# Patient Record
Sex: FEMALE | Race: WHITE | Employment: OTHER | URBAN - METROPOLITAN AREA
[De-identification: names, ages, dates, MRNs, and addresses within clinical notes are randomized per-mention and may not be internally consistent; named-entity substitution may affect disease eponyms.]

---

## 2017-01-12 ENCOUNTER — ANTI-COAG VISIT (OUTPATIENT)
Dept: HEMATOLOGY/ONCOLOGY | Age: 72
End: 2017-01-12
Attending: INTERNAL MEDICINE
Payer: MEDICARE

## 2017-01-12 DIAGNOSIS — T81.718A IATROGENIC PULMONARY EMBOLISM AND INFARCTION (HCC): ICD-10-CM

## 2017-01-12 DIAGNOSIS — Z79.01 LONG TERM (CURRENT) USE OF ANTICOAGULANTS: Primary | ICD-10-CM

## 2017-01-12 DIAGNOSIS — I26.99 IATROGENIC PULMONARY EMBOLISM AND INFARCTION (HCC): ICD-10-CM

## 2017-01-12 LAB — INR: 1.9 (ref 0.8–1.3)

## 2017-01-12 PROCEDURE — 85610 PROTHROMBIN TIME: CPT

## 2017-01-12 PROCEDURE — 36416 COLLJ CAPILLARY BLOOD SPEC: CPT

## 2017-01-19 ENCOUNTER — APPOINTMENT (OUTPATIENT)
Dept: HEMATOLOGY/ONCOLOGY | Age: 72
End: 2017-01-19
Attending: INTERNAL MEDICINE
Payer: MEDICARE

## 2017-01-25 ENCOUNTER — ANTI-COAG VISIT (OUTPATIENT)
Dept: HEMATOLOGY/ONCOLOGY | Age: 72
End: 2017-01-25
Attending: INTERNAL MEDICINE
Payer: MEDICARE

## 2017-01-25 DIAGNOSIS — T81.718A IATROGENIC PULMONARY EMBOLISM AND INFARCTION (HCC): ICD-10-CM

## 2017-01-25 DIAGNOSIS — I26.99 IATROGENIC PULMONARY EMBOLISM AND INFARCTION (HCC): ICD-10-CM

## 2017-01-25 DIAGNOSIS — Z79.01 LONG TERM (CURRENT) USE OF ANTICOAGULANTS: Primary | ICD-10-CM

## 2017-01-25 LAB — INR: 2.2 (ref 0.8–1.3)

## 2017-01-25 PROCEDURE — 85610 PROTHROMBIN TIME: CPT

## 2017-01-25 PROCEDURE — 36416 COLLJ CAPILLARY BLOOD SPEC: CPT

## 2017-02-07 ENCOUNTER — OFFICE VISIT (OUTPATIENT)
Dept: HEMATOLOGY/ONCOLOGY | Age: 72
End: 2017-02-07
Attending: INTERNAL MEDICINE
Payer: MEDICARE

## 2017-02-07 VITALS
OXYGEN SATURATION: 98 % | HEIGHT: 63.27 IN | SYSTOLIC BLOOD PRESSURE: 157 MMHG | BODY MASS INDEX: 29.96 KG/M2 | TEMPERATURE: 98 F | DIASTOLIC BLOOD PRESSURE: 81 MMHG | RESPIRATION RATE: 20 BRPM | WEIGHT: 171.19 LBS | HEART RATE: 67 BPM

## 2017-02-07 DIAGNOSIS — R20.2 LEFT LEG PARESTHESIAS: Primary | ICD-10-CM

## 2017-02-07 DIAGNOSIS — I82.412 EMBOLISM AND THROMBOSIS OF LEFT FEMORAL VEIN (HCC): ICD-10-CM

## 2017-02-07 PROCEDURE — 99214 OFFICE O/P EST MOD 30 MIN: CPT | Performed by: INTERNAL MEDICINE

## 2017-02-07 NOTE — PROGRESS NOTES
Pt here for 3 year MD f/u. Pt notes recently her \"bottom half\" is very tired, has also had the flu recently, all within the last 2 weeks. . Pt notes left leg is always cold. Energy level is fair, appetite has been good. Denies pain.      Education Record

## 2017-02-07 NOTE — PROGRESS NOTES
[facility] Progress Note    Patient Name: Fidencio Porter   YOB: 1945   Medical Record Number: [id number]   CSN: [id number]   Attending Physician: Amanda Orozco M.D.    Referring Physician: Andrew Cartagena DO      Date of Visit: 2/7/2017 History    HYSTERECTOMY         Psychosocial History:    Social History   Marital Status:   Spouse Name: N/A    Years of Education: N/A  Number of Children: N/A     Occupational History  None on file     Social History Main Topics   Smoking status: 10/05/2013       Lab Results  Component Value Date   PT 25.3* 03/06/2014   PT 12.3 10/05/2013   PT 20.0* 07/03/2013   INR 2.2* 01/25/2017   INR 1.9* 01/12/2017   INR 2.6* 12/28/2016       Impression and Plan:  Worried about having a recurrent clot with new

## 2017-02-09 ENCOUNTER — HOSPITAL ENCOUNTER (OUTPATIENT)
Dept: ULTRASOUND IMAGING | Age: 72
Discharge: HOME | End: 2017-02-09
Attending: INTERNAL MEDICINE
Payer: MEDICARE

## 2017-02-09 DIAGNOSIS — I82.412 EMBOLISM AND THROMBOSIS OF LEFT FEMORAL VEIN (HCC): ICD-10-CM

## 2017-02-09 DIAGNOSIS — R20.2 LEFT LEG PARESTHESIAS: ICD-10-CM

## 2017-02-09 PROCEDURE — 93971 EXTREMITY STUDY: CPT

## 2017-02-22 ENCOUNTER — APPOINTMENT (OUTPATIENT)
Dept: HEMATOLOGY/ONCOLOGY | Age: 72
End: 2017-02-22
Attending: INTERNAL MEDICINE
Payer: MEDICARE

## 2017-02-27 ENCOUNTER — ANTI-COAG VISIT (OUTPATIENT)
Dept: HEMATOLOGY/ONCOLOGY | Age: 72
End: 2017-02-27
Attending: INTERNAL MEDICINE
Payer: MEDICARE

## 2017-02-27 DIAGNOSIS — I26.99 IATROGENIC PULMONARY EMBOLISM AND INFARCTION (HCC): Primary | ICD-10-CM

## 2017-02-27 DIAGNOSIS — Z79.01 LONG TERM (CURRENT) USE OF ANTICOAGULANTS: ICD-10-CM

## 2017-02-27 DIAGNOSIS — T81.718A IATROGENIC PULMONARY EMBOLISM AND INFARCTION (HCC): Primary | ICD-10-CM

## 2017-02-27 LAB — INR: 2.1 (ref 0.8–1.3)

## 2017-02-27 PROCEDURE — 36416 COLLJ CAPILLARY BLOOD SPEC: CPT

## 2017-02-27 PROCEDURE — 85610 PROTHROMBIN TIME: CPT

## 2017-03-27 ENCOUNTER — ANTI-COAG VISIT (OUTPATIENT)
Dept: HEMATOLOGY/ONCOLOGY | Age: 72
End: 2017-03-27
Attending: INTERNAL MEDICINE
Payer: MEDICARE

## 2017-03-27 DIAGNOSIS — Z79.01 LONG TERM (CURRENT) USE OF ANTICOAGULANTS: ICD-10-CM

## 2017-03-27 DIAGNOSIS — T81.718A IATROGENIC PULMONARY EMBOLISM AND INFARCTION (HCC): Primary | ICD-10-CM

## 2017-03-27 DIAGNOSIS — I26.99 IATROGENIC PULMONARY EMBOLISM AND INFARCTION (HCC): Primary | ICD-10-CM

## 2017-03-27 LAB — INR: 2.3 (ref 0.8–1.3)

## 2017-03-27 PROCEDURE — 85610 PROTHROMBIN TIME: CPT

## 2017-03-27 PROCEDURE — 36416 COLLJ CAPILLARY BLOOD SPEC: CPT

## 2017-04-24 ENCOUNTER — ANTI-COAG VISIT (OUTPATIENT)
Dept: HEMATOLOGY/ONCOLOGY | Age: 72
End: 2017-04-24
Attending: INTERNAL MEDICINE
Payer: MEDICARE

## 2017-04-24 DIAGNOSIS — Z79.01 LONG TERM (CURRENT) USE OF ANTICOAGULANTS: ICD-10-CM

## 2017-04-24 DIAGNOSIS — I26.99 IATROGENIC PULMONARY EMBOLISM AND INFARCTION (HCC): Primary | ICD-10-CM

## 2017-04-24 DIAGNOSIS — T81.718A IATROGENIC PULMONARY EMBOLISM AND INFARCTION (HCC): Primary | ICD-10-CM

## 2017-05-22 ENCOUNTER — ANTI-COAG VISIT (OUTPATIENT)
Dept: HEMATOLOGY/ONCOLOGY | Age: 72
End: 2017-05-22
Attending: INTERNAL MEDICINE
Payer: MEDICARE

## 2017-05-22 DIAGNOSIS — T81.718A IATROGENIC PULMONARY EMBOLISM AND INFARCTION (HCC): Primary | ICD-10-CM

## 2017-05-22 DIAGNOSIS — I26.99 IATROGENIC PULMONARY EMBOLISM AND INFARCTION (HCC): Primary | ICD-10-CM

## 2017-05-22 DIAGNOSIS — Z79.01 LONG TERM (CURRENT) USE OF ANTICOAGULANTS: ICD-10-CM

## 2017-05-22 PROCEDURE — 85610 PROTHROMBIN TIME: CPT

## 2017-06-26 ENCOUNTER — ANTI-COAG VISIT (OUTPATIENT)
Dept: HEMATOLOGY/ONCOLOGY | Age: 72
End: 2017-06-26
Attending: INTERNAL MEDICINE
Payer: MEDICARE

## 2017-06-26 DIAGNOSIS — T81.718A IATROGENIC PULMONARY EMBOLISM AND INFARCTION (HCC): ICD-10-CM

## 2017-06-26 DIAGNOSIS — I26.99 IATROGENIC PULMONARY EMBOLISM AND INFARCTION (HCC): ICD-10-CM

## 2017-06-26 DIAGNOSIS — Z79.01 LONG TERM (CURRENT) USE OF ANTICOAGULANTS: ICD-10-CM

## 2017-06-26 PROCEDURE — 85610 PROTHROMBIN TIME: CPT

## 2017-06-26 PROCEDURE — 36416 COLLJ CAPILLARY BLOOD SPEC: CPT

## 2017-07-24 ENCOUNTER — ANTI-COAG VISIT (OUTPATIENT)
Dept: HEMATOLOGY/ONCOLOGY | Age: 72
End: 2017-07-24
Attending: INTERNAL MEDICINE
Payer: MEDICARE

## 2017-07-24 DIAGNOSIS — T81.718A IATROGENIC PULMONARY EMBOLISM AND INFARCTION (HCC): ICD-10-CM

## 2017-07-24 DIAGNOSIS — Z79.01 LONG TERM (CURRENT) USE OF ANTICOAGULANTS: ICD-10-CM

## 2017-07-24 DIAGNOSIS — I26.99 IATROGENIC PULMONARY EMBOLISM AND INFARCTION (HCC): ICD-10-CM

## 2017-07-24 LAB — INR: 2.6 (ref 0.8–1.3)

## 2017-08-17 ENCOUNTER — HOSPITAL ENCOUNTER (OUTPATIENT)
Dept: GENERAL RADIOLOGY | Age: 72
Discharge: HOME | End: 2017-08-17
Attending: FAMILY MEDICINE
Payer: MEDICARE

## 2017-08-17 DIAGNOSIS — M79.641 HAND PAIN, RIGHT: ICD-10-CM

## 2017-08-17 DIAGNOSIS — M25.531 RIGHT WRIST PAIN: ICD-10-CM

## 2017-08-17 DIAGNOSIS — M79.641 PAIN OF RIGHT HAND: ICD-10-CM

## 2017-08-17 DIAGNOSIS — M25.531 WRIST PAIN, RIGHT: ICD-10-CM

## 2017-08-17 PROCEDURE — 73110 X-RAY EXAM OF WRIST: CPT | Performed by: FAMILY MEDICINE

## 2017-08-17 PROCEDURE — 73130 X-RAY EXAM OF HAND: CPT | Performed by: FAMILY MEDICINE

## 2017-08-21 ENCOUNTER — ANTI-COAG VISIT (OUTPATIENT)
Dept: HEMATOLOGY/ONCOLOGY | Age: 72
End: 2017-08-21
Attending: INTERNAL MEDICINE
Payer: MEDICARE

## 2017-08-21 DIAGNOSIS — I26.99 IATROGENIC PULMONARY EMBOLISM AND INFARCTION (HCC): ICD-10-CM

## 2017-08-21 DIAGNOSIS — T81.718A IATROGENIC PULMONARY EMBOLISM AND INFARCTION (HCC): ICD-10-CM

## 2017-08-21 LAB — INR: 2.5 (ref 0.8–1.3)

## 2017-08-21 PROCEDURE — 85610 PROTHROMBIN TIME: CPT

## 2017-08-21 PROCEDURE — 36416 COLLJ CAPILLARY BLOOD SPEC: CPT

## 2017-09-18 ENCOUNTER — ANTI-COAG VISIT (OUTPATIENT)
Dept: HEMATOLOGY/ONCOLOGY | Age: 72
End: 2017-09-18
Attending: INTERNAL MEDICINE
Payer: MEDICARE

## 2017-09-18 DIAGNOSIS — I26.99 IATROGENIC PULMONARY EMBOLISM AND INFARCTION (HCC): ICD-10-CM

## 2017-09-18 DIAGNOSIS — T81.718A IATROGENIC PULMONARY EMBOLISM AND INFARCTION (HCC): ICD-10-CM

## 2017-09-18 LAB
INR: 1.8 (ref 0.8–1.3)
INR: 1.8 (ref 0.8–1.3)

## 2017-09-18 PROCEDURE — 85610 PROTHROMBIN TIME: CPT

## 2017-09-18 PROCEDURE — 36416 COLLJ CAPILLARY BLOOD SPEC: CPT

## 2017-09-25 ENCOUNTER — ANTI-COAG VISIT (OUTPATIENT)
Dept: HEMATOLOGY/ONCOLOGY | Age: 72
End: 2017-09-25
Attending: INTERNAL MEDICINE
Payer: MEDICARE

## 2017-09-25 DIAGNOSIS — I26.99 IATROGENIC PULMONARY EMBOLISM AND INFARCTION (HCC): ICD-10-CM

## 2017-09-25 DIAGNOSIS — T81.718A IATROGENIC PULMONARY EMBOLISM AND INFARCTION (HCC): ICD-10-CM

## 2017-09-25 LAB — INR: 2.5 (ref 0.8–1.3)

## 2017-09-25 PROCEDURE — 36416 COLLJ CAPILLARY BLOOD SPEC: CPT

## 2017-09-25 PROCEDURE — 85610 PROTHROMBIN TIME: CPT

## 2017-10-02 ENCOUNTER — ANTI-COAG VISIT (OUTPATIENT)
Dept: HEMATOLOGY/ONCOLOGY | Age: 72
End: 2017-10-02
Attending: INTERNAL MEDICINE
Payer: MEDICARE

## 2017-10-02 DIAGNOSIS — I26.99 IATROGENIC PULMONARY EMBOLISM AND INFARCTION (HCC): ICD-10-CM

## 2017-10-02 DIAGNOSIS — T81.718A IATROGENIC PULMONARY EMBOLISM AND INFARCTION (HCC): ICD-10-CM

## 2017-10-02 PROCEDURE — 85610 PROTHROMBIN TIME: CPT

## 2017-10-02 PROCEDURE — 36416 COLLJ CAPILLARY BLOOD SPEC: CPT

## 2017-10-09 ENCOUNTER — APPOINTMENT (OUTPATIENT)
Dept: HEMATOLOGY/ONCOLOGY | Age: 72
End: 2017-10-09
Attending: INTERNAL MEDICINE
Payer: MEDICARE

## 2017-10-16 ENCOUNTER — ANTI-COAG VISIT (OUTPATIENT)
Dept: HEMATOLOGY/ONCOLOGY | Age: 72
End: 2017-10-16
Attending: INTERNAL MEDICINE
Payer: MEDICARE

## 2017-10-16 DIAGNOSIS — I26.99 IATROGENIC PULMONARY EMBOLISM AND INFARCTION (HCC): ICD-10-CM

## 2017-10-16 DIAGNOSIS — T81.718A IATROGENIC PULMONARY EMBOLISM AND INFARCTION (HCC): ICD-10-CM

## 2017-10-16 PROCEDURE — 36416 COLLJ CAPILLARY BLOOD SPEC: CPT

## 2017-10-16 PROCEDURE — 85610 PROTHROMBIN TIME: CPT

## 2017-11-13 ENCOUNTER — APPOINTMENT (OUTPATIENT)
Dept: HEMATOLOGY/ONCOLOGY | Age: 72
End: 2017-11-13
Attending: INTERNAL MEDICINE
Payer: MEDICARE

## 2017-11-14 ENCOUNTER — ANTI-COAG VISIT (OUTPATIENT)
Dept: HEMATOLOGY/ONCOLOGY | Age: 72
End: 2017-11-14
Attending: INTERNAL MEDICINE
Payer: MEDICARE

## 2017-11-14 DIAGNOSIS — T81.718A IATROGENIC PULMONARY EMBOLISM AND INFARCTION (HCC): ICD-10-CM

## 2017-11-14 DIAGNOSIS — I26.99 IATROGENIC PULMONARY EMBOLISM AND INFARCTION (HCC): ICD-10-CM

## 2017-11-14 DIAGNOSIS — Z79.01 LONG TERM (CURRENT) USE OF ANTICOAGULANTS: ICD-10-CM

## 2017-11-14 PROCEDURE — 85610 PROTHROMBIN TIME: CPT

## 2017-11-14 PROCEDURE — 36416 COLLJ CAPILLARY BLOOD SPEC: CPT

## 2017-12-12 ENCOUNTER — ANTI-COAG VISIT (OUTPATIENT)
Dept: HEMATOLOGY/ONCOLOGY | Age: 72
End: 2017-12-12
Attending: INTERNAL MEDICINE
Payer: MEDICARE

## 2017-12-12 DIAGNOSIS — I26.99 IATROGENIC PULMONARY EMBOLISM AND INFARCTION (HCC): ICD-10-CM

## 2017-12-12 DIAGNOSIS — Z79.01 LONG TERM (CURRENT) USE OF ANTICOAGULANTS: ICD-10-CM

## 2017-12-12 DIAGNOSIS — T81.718A IATROGENIC PULMONARY EMBOLISM AND INFARCTION (HCC): ICD-10-CM

## 2017-12-12 PROCEDURE — 85610 PROTHROMBIN TIME: CPT

## 2017-12-12 PROCEDURE — 36416 COLLJ CAPILLARY BLOOD SPEC: CPT

## 2017-12-20 RX ORDER — WARFARIN SODIUM 2.5 MG/1
TABLET ORAL
Qty: 270 TABLET | Refills: 5 | OUTPATIENT
Start: 2017-12-20

## 2017-12-20 RX ORDER — WARFARIN SODIUM 2.5 MG/1
TABLET ORAL
Qty: 270 TABLET | Refills: 2 | Status: SHIPPED
Start: 2017-12-20 | End: 2018-11-10

## 2017-12-20 NOTE — TELEPHONE ENCOUNTER
From: Jcarlos Rosales  Sent: 12/20/2017 10:21 AM CST  Subject: Medication Renewal Request    Jcarlos Rosales would like a refill of the following medications:     WARFARIN SODIUM 2.5 MG Oral Tab Holly Jensen MD]   Patient Comment: [facility] ask for m

## 2018-01-09 ENCOUNTER — ANTI-COAG VISIT (OUTPATIENT)
Dept: HEMATOLOGY/ONCOLOGY | Age: 73
End: 2018-01-09
Attending: INTERNAL MEDICINE
Payer: MEDICARE

## 2018-01-09 DIAGNOSIS — I26.99 IATROGENIC PULMONARY EMBOLISM AND INFARCTION (HCC): ICD-10-CM

## 2018-01-09 DIAGNOSIS — Z79.01 LONG TERM (CURRENT) USE OF ANTICOAGULANTS: ICD-10-CM

## 2018-01-09 DIAGNOSIS — T81.718A IATROGENIC PULMONARY EMBOLISM AND INFARCTION (HCC): ICD-10-CM

## 2018-01-09 LAB — INR: 2.8 (ref 0.8–1.3)

## 2018-01-09 PROCEDURE — 36416 COLLJ CAPILLARY BLOOD SPEC: CPT

## 2018-01-09 PROCEDURE — 85610 PROTHROMBIN TIME: CPT

## 2018-02-06 ENCOUNTER — ANTI-COAG VISIT (OUTPATIENT)
Dept: HEMATOLOGY/ONCOLOGY | Age: 73
End: 2018-02-06
Attending: INTERNAL MEDICINE
Payer: MEDICARE

## 2018-02-06 DIAGNOSIS — Z79.01 LONG TERM (CURRENT) USE OF ANTICOAGULANTS: ICD-10-CM

## 2018-02-06 DIAGNOSIS — I26.99 IATROGENIC PULMONARY EMBOLISM AND INFARCTION (HCC): ICD-10-CM

## 2018-02-06 DIAGNOSIS — T81.718A IATROGENIC PULMONARY EMBOLISM AND INFARCTION (HCC): ICD-10-CM

## 2018-02-06 LAB — INR: 2.8 (ref 0.8–1.3)

## 2018-02-06 PROCEDURE — 36416 COLLJ CAPILLARY BLOOD SPEC: CPT

## 2018-02-06 PROCEDURE — 85610 PROTHROMBIN TIME: CPT

## 2018-03-06 ENCOUNTER — ANTI-COAG VISIT (OUTPATIENT)
Dept: HEMATOLOGY/ONCOLOGY | Age: 73
End: 2018-03-06
Attending: INTERNAL MEDICINE
Payer: MEDICARE

## 2018-03-06 DIAGNOSIS — Z79.01 LONG TERM (CURRENT) USE OF ANTICOAGULANTS: ICD-10-CM

## 2018-03-06 DIAGNOSIS — T81.718A IATROGENIC PULMONARY EMBOLISM AND INFARCTION (HCC): ICD-10-CM

## 2018-03-06 DIAGNOSIS — I26.99 IATROGENIC PULMONARY EMBOLISM AND INFARCTION (HCC): ICD-10-CM

## 2018-03-06 LAB — INR: 2.3 (ref 0.8–1.3)

## 2018-03-06 PROCEDURE — 85610 PROTHROMBIN TIME: CPT

## 2018-04-03 ENCOUNTER — ANTI-COAG VISIT (OUTPATIENT)
Dept: HEMATOLOGY/ONCOLOGY | Age: 73
End: 2018-04-03
Attending: INTERNAL MEDICINE
Payer: MEDICARE

## 2018-04-03 DIAGNOSIS — Z79.01 LONG TERM (CURRENT) USE OF ANTICOAGULANTS: ICD-10-CM

## 2018-04-03 DIAGNOSIS — I26.99 IATROGENIC PULMONARY EMBOLISM AND INFARCTION (HCC): ICD-10-CM

## 2018-04-03 DIAGNOSIS — T81.718A IATROGENIC PULMONARY EMBOLISM AND INFARCTION (HCC): ICD-10-CM

## 2018-04-03 PROCEDURE — 36416 COLLJ CAPILLARY BLOOD SPEC: CPT

## 2018-04-03 PROCEDURE — 85610 PROTHROMBIN TIME: CPT

## 2018-05-01 ENCOUNTER — APPOINTMENT (OUTPATIENT)
Dept: HEMATOLOGY/ONCOLOGY | Age: 73
End: 2018-05-01
Attending: INTERNAL MEDICINE
Payer: MEDICARE

## 2018-05-02 ENCOUNTER — ANTI-COAG VISIT (OUTPATIENT)
Dept: HEMATOLOGY/ONCOLOGY | Age: 73
End: 2018-05-02
Attending: INTERNAL MEDICINE
Payer: MEDICARE

## 2018-05-02 DIAGNOSIS — T81.718A IATROGENIC PULMONARY EMBOLISM AND INFARCTION (HCC): ICD-10-CM

## 2018-05-02 DIAGNOSIS — Z79.01 LONG TERM (CURRENT) USE OF ANTICOAGULANTS: ICD-10-CM

## 2018-05-02 DIAGNOSIS — I26.99 IATROGENIC PULMONARY EMBOLISM AND INFARCTION (HCC): ICD-10-CM

## 2018-05-02 PROCEDURE — 85610 PROTHROMBIN TIME: CPT

## 2018-05-02 PROCEDURE — 36416 COLLJ CAPILLARY BLOOD SPEC: CPT

## 2018-05-30 ENCOUNTER — ANTI-COAG VISIT (OUTPATIENT)
Dept: HEMATOLOGY/ONCOLOGY | Age: 73
End: 2018-05-30
Attending: INTERNAL MEDICINE
Payer: MEDICARE

## 2018-05-30 DIAGNOSIS — T81.718A IATROGENIC PULMONARY EMBOLISM AND INFARCTION (HCC): ICD-10-CM

## 2018-05-30 DIAGNOSIS — I26.99 IATROGENIC PULMONARY EMBOLISM AND INFARCTION (HCC): ICD-10-CM

## 2018-05-30 DIAGNOSIS — Z79.01 LONG TERM (CURRENT) USE OF ANTICOAGULANTS: ICD-10-CM

## 2018-05-30 PROCEDURE — 85610 PROTHROMBIN TIME: CPT

## 2018-05-30 PROCEDURE — 36416 COLLJ CAPILLARY BLOOD SPEC: CPT

## 2018-06-12 ENCOUNTER — APPOINTMENT (OUTPATIENT)
Dept: GENERAL RADIOLOGY | Age: 73
End: 2018-06-12
Attending: NURSE PRACTITIONER
Payer: MEDICARE

## 2018-06-12 ENCOUNTER — HOSPITAL ENCOUNTER (EMERGENCY)
Age: 73
Discharge: HOME | End: 2018-06-12
Payer: MEDICARE

## 2018-06-12 VITALS
HEART RATE: 88 BPM | BODY MASS INDEX: 30.3 KG/M2 | HEIGHT: 63 IN | OXYGEN SATURATION: 98 % | SYSTOLIC BLOOD PRESSURE: 165 MMHG | RESPIRATION RATE: 20 BRPM | DIASTOLIC BLOOD PRESSURE: 76 MMHG | TEMPERATURE: 99 F | WEIGHT: 171 LBS

## 2018-06-12 DIAGNOSIS — M25.472 LEFT ANKLE EFFUSION: Primary | ICD-10-CM

## 2018-06-12 PROCEDURE — 73610 X-RAY EXAM OF ANKLE: CPT | Performed by: NURSE PRACTITIONER

## 2018-06-12 PROCEDURE — 99283 EMERGENCY DEPT VISIT LOW MDM: CPT

## 2018-06-12 NOTE — ED PROVIDER NOTES
Patient Seen in: THE [facility] Emergency Department In [place]    History   Patient presents with:  Lower Extremity Injury (musculoskeletal)    Stated Complaint:     30-year-old female who presents to the emergency room with complaints of left ankle pain 1 h ED Triage Vitals [06/12/18 1135]  BP: (!) 165/76  Pulse: 88  Resp: 20  Temp: 99.1 °F (37.3 °C)  Temp src: Temporal  SpO2: 98 %  O2 Device: None (Room air)    Current:BP (!) 165/76   Pulse 88   Temp 99.1 °F (37.3 °C) (Temporal)   Resp 20   Ht 160 cm (5' 3\" I discussed the radiology  results with the patient. I discussed the diagnosis and need for followup with their primary care physician for further evaluation and care.  Patient states they understand diagnosis, followup plan and agree with and understand

## 2018-06-27 ENCOUNTER — ANTI-COAG VISIT (OUTPATIENT)
Dept: HEMATOLOGY/ONCOLOGY | Age: 73
End: 2018-06-27
Attending: INTERNAL MEDICINE
Payer: MEDICARE

## 2018-06-27 DIAGNOSIS — I26.99 IATROGENIC PULMONARY EMBOLISM AND INFARCTION (HCC): ICD-10-CM

## 2018-06-27 DIAGNOSIS — T81.718A IATROGENIC PULMONARY EMBOLISM AND INFARCTION (HCC): ICD-10-CM

## 2018-06-27 DIAGNOSIS — Z79.01 LONG TERM (CURRENT) USE OF ANTICOAGULANTS: ICD-10-CM

## 2018-06-27 PROCEDURE — 85610 PROTHROMBIN TIME: CPT

## 2018-06-27 PROCEDURE — 36416 COLLJ CAPILLARY BLOOD SPEC: CPT

## 2018-07-25 ENCOUNTER — ANTI-COAG VISIT (OUTPATIENT)
Dept: HEMATOLOGY/ONCOLOGY | Age: 73
End: 2018-07-25
Attending: INTERNAL MEDICINE
Payer: MEDICARE

## 2018-07-25 DIAGNOSIS — Z79.01 LONG TERM (CURRENT) USE OF ANTICOAGULANTS: ICD-10-CM

## 2018-07-25 DIAGNOSIS — T81.718A IATROGENIC PULMONARY EMBOLISM AND INFARCTION (HCC): ICD-10-CM

## 2018-07-25 DIAGNOSIS — I26.99 IATROGENIC PULMONARY EMBOLISM AND INFARCTION (HCC): ICD-10-CM

## 2018-07-25 LAB — INR: 3.5 (ref 0.8–1.3)

## 2018-07-25 PROCEDURE — 85610 PROTHROMBIN TIME: CPT

## 2018-07-25 PROCEDURE — 36416 COLLJ CAPILLARY BLOOD SPEC: CPT

## 2018-08-01 ENCOUNTER — ANTI-COAG VISIT (OUTPATIENT)
Dept: HEMATOLOGY/ONCOLOGY | Age: 73
End: 2018-08-01
Attending: INTERNAL MEDICINE
Payer: MEDICARE

## 2018-08-01 DIAGNOSIS — Z79.01 LONG TERM (CURRENT) USE OF ANTICOAGULANTS: ICD-10-CM

## 2018-08-01 DIAGNOSIS — T81.718A IATROGENIC PULMONARY EMBOLISM AND INFARCTION (HCC): ICD-10-CM

## 2018-08-01 DIAGNOSIS — I26.99 IATROGENIC PULMONARY EMBOLISM AND INFARCTION (HCC): ICD-10-CM

## 2018-08-01 LAB — INR: 3.3 (ref 0.8–1.3)

## 2018-08-01 PROCEDURE — 36416 COLLJ CAPILLARY BLOOD SPEC: CPT

## 2018-08-01 PROCEDURE — 85610 PROTHROMBIN TIME: CPT

## 2018-08-08 ENCOUNTER — ANTI-COAG VISIT (OUTPATIENT)
Dept: HEMATOLOGY/ONCOLOGY | Age: 73
End: 2018-08-08
Attending: INTERNAL MEDICINE
Payer: MEDICARE

## 2018-08-08 DIAGNOSIS — T81.718A IATROGENIC PULMONARY EMBOLISM AND INFARCTION (HCC): ICD-10-CM

## 2018-08-08 DIAGNOSIS — I26.99 IATROGENIC PULMONARY EMBOLISM AND INFARCTION (HCC): ICD-10-CM

## 2018-08-08 DIAGNOSIS — Z79.01 LONG TERM (CURRENT) USE OF ANTICOAGULANTS: ICD-10-CM

## 2018-08-08 LAB — INR: 2.4 (ref 0.8–1.3)

## 2018-08-08 PROCEDURE — 85610 PROTHROMBIN TIME: CPT

## 2018-08-08 PROCEDURE — 36416 COLLJ CAPILLARY BLOOD SPEC: CPT

## 2018-08-15 ENCOUNTER — APPOINTMENT (OUTPATIENT)
Dept: HEMATOLOGY/ONCOLOGY | Age: 73
End: 2018-08-15
Attending: INTERNAL MEDICINE
Payer: MEDICARE

## 2018-08-16 ENCOUNTER — ANTI-COAG VISIT (OUTPATIENT)
Dept: HEMATOLOGY/ONCOLOGY | Age: 73
End: 2018-08-16
Attending: INTERNAL MEDICINE
Payer: MEDICARE

## 2018-08-16 DIAGNOSIS — T81.718A IATROGENIC PULMONARY EMBOLISM AND INFARCTION (HCC): ICD-10-CM

## 2018-08-16 DIAGNOSIS — Z79.01 LONG TERM (CURRENT) USE OF ANTICOAGULANTS: ICD-10-CM

## 2018-08-16 DIAGNOSIS — I26.99 IATROGENIC PULMONARY EMBOLISM AND INFARCTION (HCC): ICD-10-CM

## 2018-08-16 LAB
INR: 1.8 (ref 0.8–1.2)
INR: 1.8 (ref 0.8–1.3)

## 2018-08-16 PROCEDURE — 36416 COLLJ CAPILLARY BLOOD SPEC: CPT

## 2018-08-16 PROCEDURE — 85610 PROTHROMBIN TIME: CPT

## 2018-08-23 ENCOUNTER — ANTI-COAG VISIT (OUTPATIENT)
Dept: HEMATOLOGY/ONCOLOGY | Age: 73
End: 2018-08-23
Attending: INTERNAL MEDICINE
Payer: MEDICARE

## 2018-08-23 DIAGNOSIS — T81.718A IATROGENIC PULMONARY EMBOLISM AND INFARCTION (HCC): ICD-10-CM

## 2018-08-23 DIAGNOSIS — I26.99 IATROGENIC PULMONARY EMBOLISM AND INFARCTION (HCC): ICD-10-CM

## 2018-08-23 DIAGNOSIS — Z79.01 LONG TERM (CURRENT) USE OF ANTICOAGULANTS: ICD-10-CM

## 2018-08-23 LAB — INR: 1.8 (ref 0.8–1.3)

## 2018-08-23 PROCEDURE — 85610 PROTHROMBIN TIME: CPT

## 2018-08-23 PROCEDURE — 36416 COLLJ CAPILLARY BLOOD SPEC: CPT

## 2018-08-30 ENCOUNTER — ANTI-COAG VISIT (OUTPATIENT)
Dept: HEMATOLOGY/ONCOLOGY | Age: 73
End: 2018-08-30
Attending: INTERNAL MEDICINE
Payer: MEDICARE

## 2018-08-30 DIAGNOSIS — T81.718A IATROGENIC PULMONARY EMBOLISM AND INFARCTION (HCC): ICD-10-CM

## 2018-08-30 DIAGNOSIS — Z79.01 LONG TERM (CURRENT) USE OF ANTICOAGULANTS: ICD-10-CM

## 2018-08-30 DIAGNOSIS — I26.99 IATROGENIC PULMONARY EMBOLISM AND INFARCTION (HCC): ICD-10-CM

## 2018-08-30 DIAGNOSIS — I82.4Y9 ACUTE VENOUS EMBOLISM AND THROMBOSIS OF DEEP VESSELS OF PROXIMAL LOWER EXTREMITY (HCC): ICD-10-CM

## 2018-08-30 LAB
ALBUMIN SERPL-MCNC: 3.4 G/DL (ref 3.5–4.8)
ALBUMIN/GLOB SERPL: 0.9 {RATIO} (ref 1–2)
ALP LIVER SERPL-CCNC: 93 U/L (ref 55–142)
ALT SERPL-CCNC: 20 U/L (ref 14–54)
ANION GAP SERPL CALC-SCNC: 9 MMOL/L (ref 0–18)
AST SERPL-CCNC: 17 U/L (ref 15–41)
BASOPHILS # BLD AUTO: 0.08 X10(3) UL (ref 0–0.1)
BASOPHILS NFR BLD AUTO: 1 %
BILIRUB SERPL-MCNC: 0.3 MG/DL (ref 0.1–2)
BUN BLD-MCNC: 19 MG/DL (ref 8–20)
BUN/CREAT SERPL: 15.2 (ref 10–20)
CALCIUM BLD-MCNC: 8.6 MG/DL (ref 8.3–10.3)
CHLORIDE SERPL-SCNC: 109 MMOL/L (ref 101–111)
CO2 SERPL-SCNC: 24 MMOL/L (ref 22–32)
CREAT BLD-MCNC: 1.25 MG/DL (ref 0.55–1.02)
EOSINOPHIL # BLD AUTO: 0.26 X10(3) UL (ref 0–0.3)
EOSINOPHIL NFR BLD AUTO: 3.3 %
ERYTHROCYTE [DISTWIDTH] IN BLOOD BY AUTOMATED COUNT: 14.3 % (ref 11.5–16)
GLOBULIN PLAS-MCNC: 3.6 G/DL (ref 2.5–4)
GLUCOSE BLD-MCNC: 91 MG/DL (ref 70–99)
HCT VFR BLD AUTO: 46.8 % (ref 34–50)
HGB BLD-MCNC: 15.3 G/DL (ref 12–16)
IMMATURE GRANULOCYTE COUNT: 0.03 X10(3) UL (ref 0–1)
IMMATURE GRANULOCYTE RATIO %: 0.4 %
INR BLD: 2.15 (ref 0.92–1.08)
LYMPHOCYTES # BLD AUTO: 2.15 X10(3) UL (ref 0.9–4)
LYMPHOCYTES NFR BLD AUTO: 26.9 %
M PROTEIN MFR SERPL ELPH: 7 G/DL (ref 6.1–8.3)
MCH RBC QN AUTO: 30.1 PG (ref 27–33.2)
MCHC RBC AUTO-ENTMCNC: 32.7 G/DL (ref 31–37)
MCV RBC AUTO: 92.1 FL (ref 81–100)
MONOCYTES # BLD AUTO: 0.75 X10(3) UL (ref 0.1–1)
MONOCYTES NFR BLD AUTO: 9.4 %
NEUTROPHIL ABS PRELIM: 4.72 X10 (3) UL (ref 1.3–6.7)
NEUTROPHILS # BLD AUTO: 4.72 X10(3) UL (ref 1.3–6.7)
NEUTROPHILS NFR BLD AUTO: 59 %
OSMOLALITY SERPL CALC.SUM OF ELEC: 296 MOSM/KG (ref 275–295)
PLATELET # BLD AUTO: 192 10(3)UL (ref 150–450)
POTASSIUM SERPL-SCNC: 4 MMOL/L (ref 3.6–5.1)
PSA SERPL DL<=0.01 NG/ML-MCNC: 24.2 SECONDS (ref 12.5–14.1)
RBC # BLD AUTO: 5.08 X10(6)UL (ref 3.8–5.1)
RED CELL DISTRIBUTION WIDTH-SD: 48.5 FL (ref 35.1–46.3)
SODIUM SERPL-SCNC: 142 MMOL/L (ref 136–144)
WBC # BLD AUTO: 8 X10(3) UL (ref 4–13)

## 2018-08-30 PROCEDURE — 36415 COLL VENOUS BLD VENIPUNCTURE: CPT

## 2018-08-30 PROCEDURE — 85610 PROTHROMBIN TIME: CPT

## 2018-08-30 PROCEDURE — 85025 COMPLETE CBC W/AUTO DIFF WBC: CPT

## 2018-08-30 PROCEDURE — 80053 COMPREHEN METABOLIC PANEL: CPT

## 2018-09-04 ENCOUNTER — OFFICE VISIT (OUTPATIENT)
Dept: HEMATOLOGY/ONCOLOGY | Age: 73
End: 2018-09-04
Attending: INTERNAL MEDICINE
Payer: MEDICARE

## 2018-09-04 ENCOUNTER — ANTI-COAG VISIT (OUTPATIENT)
Dept: HEMATOLOGY/ONCOLOGY | Facility: HOSPITAL | Age: 73
End: 2018-09-04

## 2018-09-04 VITALS
TEMPERATURE: 98 F | RESPIRATION RATE: 20 BRPM | HEART RATE: 60 BPM | WEIGHT: 175 LBS | OXYGEN SATURATION: 95 % | SYSTOLIC BLOOD PRESSURE: 149 MMHG | BODY MASS INDEX: 31 KG/M2 | DIASTOLIC BLOOD PRESSURE: 88 MMHG

## 2018-09-04 DIAGNOSIS — Z79.01 LONG TERM (CURRENT) USE OF ANTICOAGULANTS: ICD-10-CM

## 2018-09-04 DIAGNOSIS — I26.99 IATROGENIC PULMONARY EMBOLISM AND INFARCTION (HCC): ICD-10-CM

## 2018-09-04 DIAGNOSIS — I82.90 VTE (VENOUS THROMBOEMBOLISM): Primary | ICD-10-CM

## 2018-09-04 DIAGNOSIS — T81.718A IATROGENIC PULMONARY EMBOLISM AND INFARCTION (HCC): ICD-10-CM

## 2018-09-04 LAB — INR: 2.3 (ref 0.8–1.3)

## 2018-09-04 PROCEDURE — 99213 OFFICE O/P EST LOW 20 MIN: CPT | Performed by: INTERNAL MEDICINE

## 2018-09-04 NOTE — PROGRESS NOTES
[facility] Progress Note    Patient Name: Sumaya Sutton   YOB: 1945   Medical Record Number: [id number]   CSN: [id number]   Attending Physician: Germaine Bueno M.D.    Referring Physician: Dorothea Rodriguez DO      Date of Visit: 9/4/2018 Surgical History:  No date: HYSTERECTOMY  No date: TOTAL ABDOM HYSTERECTOMY    Psychosocial History:    Social History  Social History   Marital status:   Spouse name: N/A    Years of education: N/A  Number of children: N/A     Occupational History CA 8.6 08/30/2018   ALKPHO 93 08/30/2018   ALT 20 08/30/2018   AST 17 08/30/2018   BILT 0.3 08/30/2018   ALB 3.4 (L) 08/30/2018   TP 7.0 08/30/2018       Lab Results  Component Value Date   PT 25.3 (H) 03/06/2014   PT 12.3 10/05/2013   PT 20.0 (H) 07/03/

## 2018-09-04 NOTE — PROGRESS NOTES
Pt here for MD f/u visit, h/o DVT. Taking Coumadin as directed. INR therapeutic today, she will continue same dose and recheck in 2 weeks. On and off rash she believes is related to her Beta blockers.  Was taking DHT/Chinese herbs but this caused her INR to

## 2018-09-18 ENCOUNTER — ANTI-COAG VISIT (OUTPATIENT)
Dept: HEMATOLOGY/ONCOLOGY | Age: 73
End: 2018-09-18
Attending: INTERNAL MEDICINE
Payer: MEDICARE

## 2018-09-18 DIAGNOSIS — Z79.01 LONG TERM (CURRENT) USE OF ANTICOAGULANTS: ICD-10-CM

## 2018-09-18 DIAGNOSIS — T81.718A IATROGENIC PULMONARY EMBOLISM AND INFARCTION (HCC): ICD-10-CM

## 2018-09-18 DIAGNOSIS — I26.99 IATROGENIC PULMONARY EMBOLISM AND INFARCTION (HCC): ICD-10-CM

## 2018-09-18 LAB — INR: 2.7 (ref 0.8–1.3)

## 2018-09-18 PROCEDURE — 85610 PROTHROMBIN TIME: CPT

## 2018-09-18 PROCEDURE — 36416 COLLJ CAPILLARY BLOOD SPEC: CPT

## 2018-10-18 ENCOUNTER — ANTI-COAG VISIT (OUTPATIENT)
Dept: HEMATOLOGY/ONCOLOGY | Age: 73
End: 2018-10-18
Attending: INTERNAL MEDICINE
Payer: MEDICARE

## 2018-10-18 DIAGNOSIS — Z79.01 LONG TERM (CURRENT) USE OF ANTICOAGULANTS: ICD-10-CM

## 2018-10-18 DIAGNOSIS — T81.718A IATROGENIC PULMONARY EMBOLISM AND INFARCTION (HCC): ICD-10-CM

## 2018-10-18 DIAGNOSIS — I26.99 IATROGENIC PULMONARY EMBOLISM AND INFARCTION (HCC): ICD-10-CM

## 2018-10-18 PROCEDURE — 36416 COLLJ CAPILLARY BLOOD SPEC: CPT

## 2018-10-18 PROCEDURE — 85610 PROTHROMBIN TIME: CPT

## 2018-11-12 RX ORDER — WARFARIN SODIUM 2.5 MG/1
TABLET ORAL
Qty: 270 TABLET | Refills: 1 | Status: SHIPPED | OUTPATIENT
Start: 2018-11-12 | End: 2019-06-21

## 2018-11-15 ENCOUNTER — APPOINTMENT (OUTPATIENT)
Dept: HEMATOLOGY/ONCOLOGY | Age: 73
End: 2018-11-15
Attending: INTERNAL MEDICINE
Payer: MEDICARE

## 2018-11-16 ENCOUNTER — ANTI-COAG VISIT (OUTPATIENT)
Dept: HEMATOLOGY/ONCOLOGY | Age: 73
End: 2018-11-16
Attending: INTERNAL MEDICINE
Payer: MEDICARE

## 2018-11-16 DIAGNOSIS — Z79.01 LONG TERM (CURRENT) USE OF ANTICOAGULANTS: ICD-10-CM

## 2018-11-16 DIAGNOSIS — I26.99 IATROGENIC PULMONARY EMBOLISM AND INFARCTION (HCC): ICD-10-CM

## 2018-11-16 DIAGNOSIS — T81.718A IATROGENIC PULMONARY EMBOLISM AND INFARCTION (HCC): ICD-10-CM

## 2018-11-16 PROCEDURE — 36416 COLLJ CAPILLARY BLOOD SPEC: CPT

## 2018-11-16 PROCEDURE — 85610 PROTHROMBIN TIME: CPT

## 2018-11-16 NOTE — PROGRESS NOTES
Darinel MCDANIEL  P Edw [facility]acey Rns             INR Results: 2.3     Any Missed Doses? :     no     Antibiotic or Alcohol Intake?: no antibiotics or alcohol       Refill needed?: no     Current Dose: 6.25mg daily and 5mg on sun.stay on the same and come

## 2018-12-21 ENCOUNTER — ANTI-COAG VISIT (OUTPATIENT)
Dept: HEMATOLOGY/ONCOLOGY | Age: 73
End: 2018-12-21
Attending: INTERNAL MEDICINE
Payer: MEDICARE

## 2018-12-21 DIAGNOSIS — Z79.01 LONG TERM (CURRENT) USE OF ANTICOAGULANTS: ICD-10-CM

## 2018-12-21 DIAGNOSIS — I26.99 IATROGENIC PULMONARY EMBOLISM AND INFARCTION (HCC): ICD-10-CM

## 2018-12-21 DIAGNOSIS — T81.718A IATROGENIC PULMONARY EMBOLISM AND INFARCTION (HCC): ICD-10-CM

## 2018-12-21 PROCEDURE — 85610 PROTHROMBIN TIME: CPT

## 2018-12-21 PROCEDURE — 36416 COLLJ CAPILLARY BLOOD SPEC: CPT

## 2018-12-28 ENCOUNTER — ANTI-COAG VISIT (OUTPATIENT)
Dept: HEMATOLOGY/ONCOLOGY | Age: 73
End: 2018-12-28
Attending: INTERNAL MEDICINE
Payer: MEDICARE

## 2018-12-28 DIAGNOSIS — I26.99 IATROGENIC PULMONARY EMBOLISM AND INFARCTION (HCC): ICD-10-CM

## 2018-12-28 DIAGNOSIS — T81.718A IATROGENIC PULMONARY EMBOLISM AND INFARCTION (HCC): ICD-10-CM

## 2018-12-28 DIAGNOSIS — Z79.01 LONG TERM (CURRENT) USE OF ANTICOAGULANTS: ICD-10-CM

## 2018-12-28 PROCEDURE — 85610 PROTHROMBIN TIME: CPT

## 2018-12-28 PROCEDURE — 36416 COLLJ CAPILLARY BLOOD SPEC: CPT

## 2019-01-04 ENCOUNTER — OFFICE VISIT (OUTPATIENT)
Dept: FAMILY MEDICINE CLINIC | Facility: CLINIC | Age: 74
End: 2019-01-04
Payer: MEDICARE

## 2019-01-04 ENCOUNTER — ANTI-COAG VISIT (OUTPATIENT)
Dept: HEMATOLOGY/ONCOLOGY | Age: 74
End: 2019-01-04
Attending: INTERNAL MEDICINE
Payer: MEDICARE

## 2019-01-04 VITALS
TEMPERATURE: 99 F | DIASTOLIC BLOOD PRESSURE: 74 MMHG | OXYGEN SATURATION: 98 % | BODY MASS INDEX: 31 KG/M2 | WEIGHT: 176.38 LBS | HEART RATE: 71 BPM | RESPIRATION RATE: 20 BRPM | SYSTOLIC BLOOD PRESSURE: 146 MMHG

## 2019-01-04 DIAGNOSIS — Z02.9 ENCOUNTER FOR ADMINISTRATIVE EXAMINATIONS: Primary | ICD-10-CM

## 2019-01-04 DIAGNOSIS — Z79.01 LONG TERM (CURRENT) USE OF ANTICOAGULANTS: ICD-10-CM

## 2019-01-04 DIAGNOSIS — T81.718A IATROGENIC PULMONARY EMBOLISM AND INFARCTION (HCC): ICD-10-CM

## 2019-01-04 DIAGNOSIS — I26.99 IATROGENIC PULMONARY EMBOLISM AND INFARCTION (HCC): ICD-10-CM

## 2019-01-04 LAB — INR: 2 (ref 0.8–1.3)

## 2019-01-04 PROCEDURE — 36416 COLLJ CAPILLARY BLOOD SPEC: CPT

## 2019-01-04 PROCEDURE — 85610 PROTHROMBIN TIME: CPT

## 2019-01-04 NOTE — PROGRESS NOTES
CHIEF COMPLAINT:   Patient presents with:  Eye Problem: left eye swollen, drainage, crusty 3wks     HPI:   Jey Ortega is a 75year old female who presents with chief complaint of left eye surrounding area redness and crusting for 3 weeks.  Had cataract s tobacco: Never Used    Alcohol use: No    Drug use: Not on file        REVIEW OF SYSTEMS:   GENERAL: feels well otherwise  SKIN: no rashes  EYES:denies blurred vision or double vision.  See HPI  HENT: denies ear pain, congestion, sore throat  LUNGS: denies

## 2019-01-18 ENCOUNTER — ANTI-COAG VISIT (OUTPATIENT)
Dept: HEMATOLOGY/ONCOLOGY | Age: 74
End: 2019-01-18
Attending: INTERNAL MEDICINE
Payer: MEDICARE

## 2019-01-18 DIAGNOSIS — Z79.01 LONG TERM (CURRENT) USE OF ANTICOAGULANTS: ICD-10-CM

## 2019-01-18 DIAGNOSIS — T81.718A IATROGENIC PULMONARY EMBOLISM AND INFARCTION (HCC): ICD-10-CM

## 2019-01-18 DIAGNOSIS — I26.99 IATROGENIC PULMONARY EMBOLISM AND INFARCTION (HCC): ICD-10-CM

## 2019-01-18 LAB — INR: 2 (ref 0.8–1.3)

## 2019-01-18 PROCEDURE — 36416 COLLJ CAPILLARY BLOOD SPEC: CPT

## 2019-01-18 PROCEDURE — 85610 PROTHROMBIN TIME: CPT

## 2019-02-19 ENCOUNTER — ANTI-COAG VISIT (OUTPATIENT)
Dept: HEMATOLOGY/ONCOLOGY | Age: 74
End: 2019-02-19
Attending: INTERNAL MEDICINE
Payer: MEDICARE

## 2019-02-19 DIAGNOSIS — I26.99 IATROGENIC PULMONARY EMBOLISM AND INFARCTION (HCC): ICD-10-CM

## 2019-02-19 DIAGNOSIS — Z79.01 LONG TERM (CURRENT) USE OF ANTICOAGULANTS: ICD-10-CM

## 2019-02-19 DIAGNOSIS — T81.718A IATROGENIC PULMONARY EMBOLISM AND INFARCTION (HCC): ICD-10-CM

## 2019-02-19 LAB — INR: 2.2 (ref 0.8–1.3)

## 2019-02-19 PROCEDURE — 36416 COLLJ CAPILLARY BLOOD SPEC: CPT

## 2019-02-19 PROCEDURE — 85610 PROTHROMBIN TIME: CPT

## 2019-04-02 ENCOUNTER — ANTI-COAG VISIT (OUTPATIENT)
Dept: HEMATOLOGY/ONCOLOGY | Age: 74
End: 2019-04-02
Attending: INTERNAL MEDICINE
Payer: MEDICARE

## 2019-04-02 DIAGNOSIS — T81.718A IATROGENIC PULMONARY EMBOLISM AND INFARCTION (HCC): ICD-10-CM

## 2019-04-02 DIAGNOSIS — I26.99 IATROGENIC PULMONARY EMBOLISM AND INFARCTION (HCC): ICD-10-CM

## 2019-04-02 DIAGNOSIS — Z79.01 LONG TERM (CURRENT) USE OF ANTICOAGULANTS: ICD-10-CM

## 2019-04-02 PROCEDURE — 85610 PROTHROMBIN TIME: CPT

## 2019-04-02 PROCEDURE — 36416 COLLJ CAPILLARY BLOOD SPEC: CPT

## 2019-05-07 ENCOUNTER — ANTI-COAG VISIT (OUTPATIENT)
Dept: HEMATOLOGY/ONCOLOGY | Age: 74
End: 2019-05-07
Attending: INTERNAL MEDICINE
Payer: MEDICARE

## 2019-05-07 DIAGNOSIS — T81.718A IATROGENIC PULMONARY EMBOLISM AND INFARCTION (HCC): ICD-10-CM

## 2019-05-07 DIAGNOSIS — Z79.01 LONG TERM (CURRENT) USE OF ANTICOAGULANTS: ICD-10-CM

## 2019-05-07 DIAGNOSIS — I26.99 IATROGENIC PULMONARY EMBOLISM AND INFARCTION (HCC): ICD-10-CM

## 2019-05-07 PROCEDURE — 36416 COLLJ CAPILLARY BLOOD SPEC: CPT

## 2019-05-07 PROCEDURE — 85610 PROTHROMBIN TIME: CPT

## 2019-06-04 ENCOUNTER — ANTI-COAG VISIT (OUTPATIENT)
Dept: HEMATOLOGY/ONCOLOGY | Age: 74
End: 2019-06-04
Attending: INTERNAL MEDICINE
Payer: MEDICARE

## 2019-06-04 DIAGNOSIS — T81.718A IATROGENIC PULMONARY EMBOLISM AND INFARCTION (HCC): ICD-10-CM

## 2019-06-04 DIAGNOSIS — Z79.01 LONG TERM (CURRENT) USE OF ANTICOAGULANTS: ICD-10-CM

## 2019-06-04 DIAGNOSIS — I26.99 IATROGENIC PULMONARY EMBOLISM AND INFARCTION (HCC): ICD-10-CM

## 2019-06-04 PROCEDURE — 36416 COLLJ CAPILLARY BLOOD SPEC: CPT

## 2019-06-04 PROCEDURE — 85610 PROTHROMBIN TIME: CPT

## 2019-06-21 RX ORDER — WARFARIN SODIUM 2.5 MG/1
TABLET ORAL
Qty: 270 TABLET | Refills: 0 | Status: SHIPPED | OUTPATIENT
Start: 2019-06-21 | End: 2019-10-10

## 2019-10-10 RX ORDER — WARFARIN SODIUM 2.5 MG/1
TABLET ORAL
Qty: 30 TABLET | Refills: 0 | Status: SHIPPED | OUTPATIENT
Start: 2019-10-10

## 2020-02-13 ENCOUNTER — TELEPHONE (OUTPATIENT)
Dept: HEMATOLOGY/ONCOLOGY | Facility: HOSPITAL | Age: 75
End: 2020-02-13

## 2020-02-13 ENCOUNTER — ANTI-COAG VISIT (OUTPATIENT)
Dept: HEMATOLOGY/ONCOLOGY | Facility: HOSPITAL | Age: 75
End: 2020-02-13

## 2020-02-13 DIAGNOSIS — I26.99 IATROGENIC PULMONARY EMBOLISM AND INFARCTION (HCC): ICD-10-CM

## 2020-02-13 DIAGNOSIS — Z79.01 LONG TERM (CURRENT) USE OF ANTICOAGULANTS: ICD-10-CM

## 2020-02-13 DIAGNOSIS — T81.718A IATROGENIC PULMONARY EMBOLISM AND INFARCTION (HCC): ICD-10-CM

## 2021-01-11 ENCOUNTER — ORDER TRANSCRIPTION (OUTPATIENT)
Dept: ADMINISTRATIVE | Facility: HOSPITAL | Age: 76
End: 2021-01-11

## 2021-01-11 DIAGNOSIS — Z20.828 EXPOSURE TO SARS-ASSOCIATED CORONAVIRUS: Primary | ICD-10-CM

## (undated) NOTE — ED AVS SNAPSHOT
Akua Nobles   MRN: [id number]    Department:  THE [facility] Emergency Department in [place]   Date of Visit:  6/12/2018           Disclosure     Insurance plans vary and the physician(s) referred by the ER may not be covered by your plan.  Please contac tell this physician (or your personal doctor if your instructions are to return to your personal doctor) about any new or lasting problems. The primary care or specialist physician will see patients referred from the [facility] Emergency Department.  Ishan Hernandez

## (undated) NOTE — MR AVS SNAPSHOT
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 If you've been to the Emergency Department or your doctor's office, you can view your past visit information in [facility] by going to Visits < Visit Summaries. [employer] questions?